# Patient Record
(demographics unavailable — no encounter records)

---

## 2025-06-29 NOTE — PROCEDURE
[Bilateral] : bilaterally of the [1st] : 1st [Metatarsophalangeal joint] : metatarsophalangeal joint [] : Patient tolerated procedure well [Call if redness, pain or fever occur] : call if redness, pain or fever occur [Previous OTC use and PT nontherapeutic] : patient has tried OTC's including aspirin, Ibuprofen, Aleve, etc or prescription NSAIDS, and/or exercises at home and/or physical therapy without satisfactory response [Patient had decreased mobility in the joint] : patient had decreased mobility in the joint [Risks, benefits, alternatives discussed / Verbal consent obtained] : the risks benefits, and alternatives have been discussed, and verbal consent was obtained [Pain] : pain [Alcohol] : alcohol [___ cc    1%] : Lidocaine ~Vcc of 1%  [___ cc    4mg] : Dexamethasone (Decadron) ~Vcc of 4 mg

## 2025-07-19 NOTE — DISCUSSION/SUMMARY
[de-identified] : I discussed the patient's clinical right wrist findings with him.  He would like to proceed forward with continued conservative management.  I gave him a suggestion for an orthotic to use which would be a Fox's extension.  He wished to proceed forward bilateral injections.  Under sterile conditions I injected 1 cc of lidocaine and 1 cc of dexamethasone into the hallux MTP joints present bilaterally.  Patient tolerated procedure well.  I will see him back in 4 months for reassessment.  All questions answered.

## 2025-07-19 NOTE — PHYSICAL EXAM
[de-identified] : Bilaterally there is prominence of the dorsal aspect of the first MTP joint.  There is pain with terminal dorsiflexion.  He can only terminally dorsiflex to about 20 degrees with pain involved.  There is some pain throughout the mid arc of motion.  He is neurovascular intact distally.

## 2025-07-19 NOTE — DATA REVIEWED
[FreeTextEntry1] : 3 views of the patient's bilateral feet ordered reviewed by me personally.  X-rays demonstrate evidence of hallux rigidus present bilaterally.  There is bone-on-bone apposition present.  Dorsal osteophytes present.

## 2025-07-19 NOTE — HISTORY OF PRESENT ILLNESS
[de-identified] : 53-year-old patient I am meeting for the first time presenting for consultation regards to his bilateral hallux MTP joints.  He has a known history of hallux rigidus present bilaterally.  He has been seen for this before but is recently relocated here to [place] and is establishing care here.  In the past he has gotten injections.  He has had about 4 injections in the past.  The most recent injected was done about 4 months ago and lasted around several months.  He reports pain over the hallux MTP joint alone which is worsened with activity and alleviated by rest.  Denies any fevers chills.